# Patient Record
Sex: MALE | Race: WHITE | NOT HISPANIC OR LATINO | URBAN - METROPOLITAN AREA
[De-identification: names, ages, dates, MRNs, and addresses within clinical notes are randomized per-mention and may not be internally consistent; named-entity substitution may affect disease eponyms.]

---

## 2020-08-13 ENCOUNTER — EMERGENCY (EMERGENCY)
Facility: HOSPITAL | Age: 45
LOS: 1 days | Discharge: HOME | End: 2020-08-13
Attending: EMERGENCY MEDICINE
Payer: COMMERCIAL

## 2020-08-13 VITALS
TEMPERATURE: 98 F | SYSTOLIC BLOOD PRESSURE: 134 MMHG | HEIGHT: 71 IN | RESPIRATION RATE: 18 BRPM | DIASTOLIC BLOOD PRESSURE: 83 MMHG | OXYGEN SATURATION: 101 % | HEART RATE: 105 BPM | WEIGHT: 214.95 LBS

## 2020-08-13 VITALS — HEART RATE: 98 BPM

## 2020-08-13 PROCEDURE — 12001 RPR S/N/AX/GEN/TRNK 2.5CM/<: CPT

## 2020-08-13 PROCEDURE — 99283 EMERGENCY DEPT VISIT LOW MDM: CPT | Mod: 25

## 2020-08-13 RX ORDER — CEPHALEXIN 500 MG
500 CAPSULE ORAL ONCE
Refills: 0 | Status: COMPLETED | OUTPATIENT
Start: 2020-08-13 | End: 2020-08-13

## 2020-08-13 RX ORDER — CEPHALEXIN 500 MG
1 CAPSULE ORAL
Qty: 21 | Refills: 0
Start: 2020-08-13 | End: 2020-08-19

## 2020-08-13 RX ORDER — IBUPROFEN 200 MG
600 TABLET ORAL ONCE
Refills: 0 | Status: COMPLETED | OUTPATIENT
Start: 2020-08-13 | End: 2020-08-13

## 2020-08-13 RX ADMIN — Medication 600 MILLIGRAM(S): at 19:19

## 2020-08-13 RX ADMIN — Medication 500 MILLIGRAM(S): at 19:19

## 2020-08-13 NOTE — ED PROVIDER NOTE - PATIENT PORTAL LINK FT
You can access the FollowMyHealth Patient Portal offered by [facility] by registering at the following website: [email or web address]. By joining [employer]’s FollowMyHealth portal, you will also be able to view your health information using other applications (apps) compatible with our system.

## 2020-08-13 NOTE — ED PROVIDER NOTE - NSFOLLOWUPINSTRUCTIONS_ED_ALL_ED_FT
Laceration    A laceration is a cut that goes through all of the layers of the skin and into the tissue that is right under the skin. Some lacerations heal on their own. Others need to be closed with skin adhesive strips, skin glue, stitches (sutures), or staples. Proper laceration care minimizes the risk of infection and helps the laceration to heal better.  Non-absorbable stitches  have been placed, they must be taken out in 10 days.     SEEK IMMEDIATE MEDICAL CARE IF YOU HAVE ANY OF THE FOLLOWING SYMPTOMS: swelling around the wound, worsening pain, drainage from the wound, red streaking going away from your wound, inability to move finger or toe near the laceration, or discoloration of skin near the laceration.     Wash area daily with soap and water, pat dry you may apply a thin layer of bacitracin. keep covered.   make sure to wear gloves while working.   Take full course of antibiotics prescribed.   Take Motrin and Tylenol as directed on bottle as needed for pain.

## 2020-08-13 NOTE — ED PROVIDER NOTE - ATTENDING CONTRIBUTION TO CARE
Laceration to Lt dorsal hand in between 2nd and 3rd finger prior to arrival. Pt states he was cutting carpet and blade slipped resulting in laceration. Denies sensory or motor deficit. Denies fever/chills/N/V. Tetanus UDT in last 5 yrs.  laceration 2 cm deep to muscle  from ext/flex to stress plan lac repair

## 2020-08-13 NOTE — ED PROVIDER NOTE - PHYSICAL EXAMINATION
2.5 cm linear laceration to rt dorsal hand in between 1st and 2nd digit. Bleeding controlled. No sensory or motor deficit. < 2 sec cap refill. 2.5 cm linear laceration to Lt dorsal hand in between 1st and 2nd digit. Bleeding controlled. No sensory or motor deficit. < 2 sec cap refill.

## 2020-08-13 NOTE — ED PROVIDER NOTE - CLINICAL SUMMARY MEDICAL DECISION MAKING FREE TEXT BOX
44 y/o M with laceration to rt hand. Sutures placed. Tetanus UTD. Keflex and Motrin given. Will dc home with strict ED return precautions 46 y/o M with laceration to Lt hand. Sutures placed. Tetanus UTD. Keflex and Motrin given. Will dc home with strict ED return precautions

## 2020-08-13 NOTE — ED ADULT TRIAGE NOTE - CHIEF COMPLAINT QUOTE
pt A&O x 4 presents to ED with laceration to left hand from crouch knife. Pt states tetanus within the last 5 years, bleeding controlled at this time

## 2020-08-13 NOTE — ED PROVIDER NOTE - OBJECTIVE STATEMENT
46 y/o M with no pmhx presents to ED with laceration to rt dorsal hand in between 2nd and 3rd finger prior to arrival. Pt states he was cutting carpet and blade slipped resulting in laceration. Denies sensory or motor deficit. Denies fever/chills/N/V. Tetanus UDT in last 5 yrs. 46 y/o M with no pmhx presents to ED with laceration to Lt dorsal hand in between 2nd and 3rd finger prior to arrival. Pt states he was cutting carpet and blade slipped resulting in laceration. Denies sensory or motor deficit. Denies fever/chills/N/V. Tetanus UDT in last 5 yrs.

## 2020-11-18 NOTE — ED PROVIDER NOTE - GASTROINTESTINAL, MLM
[Normal Sclera/Conjunctiva] : normal sclera/conjunctiva [EOMI] : extraocular movements intact [Soft] : abdomen soft [Non Tender] : non-tender [Non-distended] : non-distended [No Masses] : no abdominal mass palpated [No HSM] : no HSM [Normal Bowel Sounds] : normal bowel sounds [No CVA Tenderness] : no CVA  tenderness [No Rash] : no rash [Normal] : affect was normal and insight and judgment were intact Abdomen soft, non-tender, no guarding. [de-identified] : hypersensibility to touch in skin of right lower abdomen. No wounds or rash noted.